# Patient Record
Sex: MALE | Race: WHITE | NOT HISPANIC OR LATINO | Employment: FULL TIME | URBAN - METROPOLITAN AREA
[De-identification: names, ages, dates, MRNs, and addresses within clinical notes are randomized per-mention and may not be internally consistent; named-entity substitution may affect disease eponyms.]

---
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
## 2024-12-30 ENCOUNTER — TELEPHONE (OUTPATIENT)
Dept: AUDIOLOGY | Facility: CLINIC | Age: 19
End: 2024-12-30
Payer: MEDICAID

## 2024-12-30 NOTE — TELEPHONE ENCOUNTER
----- Message from Elton Rosado sent at 12/30/2024  2:18 PM CST -----  Regarding: FW: Questions and concerns  Contact: [phone]  Patient device no longer working needs to order a replacement. Can you reach out to the father to discuss further?  ----- Message -----  From: Beatriz Branch MA  Sent: 12/26/2024   4:01 PM CST  To: Ashlee Nichols MA  Subject: FW: Questions and concerns                         ----- Message -----  From: Rosemarie Najera  Sent: 12/26/2024   3:56 PM CST  To: Kyler MILLER Staff  Subject: Questions and concerns                           Type:  Needs Medical Advice    Who Called:  Marj  Would the patient rather a call back or a response via [facility]? Call  Best Call Back Number: [phone]  Additional Information:  Would like to speak to the office about patient Cochlear Implant

## 2025-01-10 ENCOUNTER — CLINICAL SUPPORT (OUTPATIENT)
Dept: AUDIOLOGY | Facility: CLINIC | Age: 20
End: 2025-01-10
Payer: MEDICAID

## 2025-01-10 DIAGNOSIS — H90.3 SENSORINEURAL HEARING LOSS (SNHL) OF BOTH EARS: Primary | ICD-10-CM

## 2025-01-10 NOTE — PROGRESS NOTES
Cochlear Implant Programming Session    RIGHT EAR:  Dr. Chávez  :  [employer]  Internal Device/Serial Number:  N7  Processor:  [id number]  DOS:  11/8/2007  Fitting Date:  12/31/2019  Processor Color:  Beige  Magnet Strength:  #2  Coil Length:  6cm  Battery Type:  Standard Rechargeable  Warranty:  3 year    Dilip Billy was seen today for a follow up cochlear implant programming session, and he was accompanied to this appointment by his father. His father reported that Dilip's processor has stopped working, and that he has been without sound for several months. Dilip starts back to college this coming week. Dilip was given a new coil from [employer] that he changed, but that did not fix the issue.     Current processor would not turn on or connect to the computer. Dilip was given a loaner processor, which was programmed with his own MAPs. Some adjustments were made to his programs, and he was pleased with the sound quality. Dilip was given some 675 batteries to use in case he needs them for his disposable batteries case.     Discussed with Dilip and his father that they need to call [employer] about getting his processor repaired. They were in agreement with this plan.     Recommendations:  1) Return loaner processor once Dilip receives new processor  2) Daily use of processor  3) Follow up programming as needed